# Patient Record
Sex: FEMALE | Race: BLACK OR AFRICAN AMERICAN
[De-identification: names, ages, dates, MRNs, and addresses within clinical notes are randomized per-mention and may not be internally consistent; named-entity substitution may affect disease eponyms.]

---

## 2018-04-06 ENCOUNTER — HOSPITAL ENCOUNTER (EMERGENCY)
Dept: HOSPITAL 17 - NEPD | Age: 58
Discharge: HOME | End: 2018-04-06
Payer: COMMERCIAL

## 2018-04-06 VITALS — HEIGHT: 67 IN | BODY MASS INDEX: 37.72 KG/M2 | WEIGHT: 240.3 LBS

## 2018-04-06 VITALS
DIASTOLIC BLOOD PRESSURE: 87 MMHG | OXYGEN SATURATION: 98 % | RESPIRATION RATE: 18 BRPM | TEMPERATURE: 98.4 F | HEART RATE: 84 BPM | SYSTOLIC BLOOD PRESSURE: 150 MMHG

## 2018-04-06 DIAGNOSIS — S51.812A: Primary | ICD-10-CM
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
## 2018-04-06 PROCEDURE — 12001 RPR S/N/AX/GEN/TRNK 2.5CM/<: CPT

## 2018-04-06 NOTE — PD
HPI


Chief Complaint:  Laceration/Skin Injury


Time Seen by Provider:  23:02


Travel History


International Travel<30 days:  No


Contact w/Intl Traveler<30days:  No


Traveled to known affect area:  No





History of Present Illness


HPI


Patient is a 57-year-old female presenting to emergency department for 

evaluation of a laceration.  Patient states she was trying to open a bag with a 

knife around 5 PM this evening when it slipped cutting her left forearm.  

Patient reports her pain is a 7 out of 10 and states is throbbing.  Her tetanus 

vaccine is up-to-date.  Symptom onset was sudden, symptoms are moderate in 

severity.  There are no alleviating factors.  She has no other complaints at 

this time.





[facility]


Past Medical History


Medical History:  Denies Significant Hx


Tetanus Vaccination:  < 5 Years





Social History


Tobacco Use:  No





Allergies-Medications


(Allergen,Severity, Reaction):  


Coded Allergies:  


     diatrizoate meglumine (Unverified  Allergy, Severe, 4/6/18)


     gadobenic acid (Unverified  Allergy, Severe, 4/6/18)


     gadodiamide (Unverified  Allergy, Severe, 4/6/18)


     gadoteridol (Unverified  Allergy, Severe, 4/6/18)


     iodixanol (Unverified  Allergy, Severe, 4/6/18)


     iohexol (Unverified  Allergy, Severe, 4/6/18)


Reported Meds & Prescriptions





Reported Meds & Active Scripts


Active


Ibuprofen 800 Mg Tab 800 Mg PO Q8 PRN


Robaxin (Methocarbamol) 750 Mg Tab 750 Mg PO QID PRN


Reported


Amlodipine Besylate 5 Mg Tab 1 Tab PO DAILY 








Review of Systems


Except as stated in HPI:  all other systems reviewed are Neg


Skin:  Positive Other (Laceration)





Physical Exam


Narrative


GENERAL: Well-developed, well-nourished, alert female.  Presenting in no acute 

distress.


SKIN: Warm and dry.  1 cm superficial laceration to the mid left forearm.


HEAD: Normocephalic.


EYES: No scleral icterus. No injection or drainage. 


NECK: Supple, trachea midline. No JVD or lymphadenopathy.


CARDIOVASCULAR: Regular rate and rhythm without murmurs, gallops, or rubs. 


RESPIRATORY: Breath sounds equal bilaterally. No accessory muscle use.


GASTROINTESTINAL: Abdomen soft, non-tender, nondistended. 


MUSCULOSKELETAL: No cyanosis, or edema.  5 out of 5  strength of bilateral 

upper extremities, 2+ radial pulse.


BACK: Nontender without obvious deformity. No CVA tenderness.








Data


Data


Last Documented VS





Vital Signs








  Date Time  Temp Pulse Resp B/P (MAP) Pulse Ox O2 Delivery O2 Flow Rate FiO2


 


4/6/18 18:35 98.4 84 18 150/87 (108) 98   








Orders





 Orders


Oxycodone-Acetamin 5-325 Mg (Percocet (4/6/18 23:45)








MDM


Medical Decision Making


Medical Screen Exam Complete:  Yes


Emergency Medical Condition:  Yes


Interpretation(s)





Vital Signs








  Date Time  Temp Pulse Resp B/P (MAP) Pulse Ox O2 Delivery O2 Flow Rate FiO2


 


4/6/18 18:35 98.4 84 18 150/87 (108) 98   








Differential Diagnosis


Laceration versus abrasion versus tendon injury versus other


Narrative Course


Patient is a 57-year-old female presenting for evaluation of a laceration.  

Patient is neurovascularly intact with no focal deficits on exam.  There is no 

weakness in that extremity, no sign of tendon injury.  Please see procedure 

report for laceration repair.  Patient was given Percocet 1 for pain.  She was 

encouraged to follow-up with her primary doctor.  She was advised on signs and 

symptoms of infection.  He was also given wound care instructions.  She 

verbalized understanding of these instructions.  Patient stable for discharge.





Procedures


**Procedure Narrative**


LACERATION


LOCATION: Left forearm


LENGTH: 1 cm


NUMBER OF STITCHES/STAPLES: 2 stitches





REPAIR: The area of the laceration was prepped with Betadine and sterilely 

draped.  The laceration was infiltrated with  


1% lidocaine with.  The wound was copiously irrigated and explored without 

evidence of foreign body, tendon injury or neurovascular  


injury.  The wound was closed using 4-0 Ethilon. This was a 1 layer repair. A 

sterile dressing was applied. The patient was  


advised to keep the dressing clean and dry. Patient tolerated the procedure 

well.





Diagnosis





 Primary Impression:  


 Laceration of forearm


 Qualified Codes:  S51.812A - Laceration without foreign body of left forearm, 

initial encounter


Referrals:  


Primary Care Physician


1 week


Patient Instructions:  Care For Your Stitches (ED), General Instructions, 

Laceration (ED)





***Additional Instructions:  


Keep stitches clean and dry, stitches will need to be removed in 1 week


Return to emergency department for follow-up with your primary doctor


Take medication as needed and as directed for pain


Return immediately for any new or worsening symptoms


***Med/Other Pt SpecificInfo:  Prescription(s) given


Scripts


Ibuprofen (Ibuprofen) 800 Mg Tab


800 MG PO Q6HR Y for PAIN, #40 TAB 0 Refills


   Prov: Jannet Louis         4/6/18


Disposition:  01 DISCHARGE HOME


Condition:  Stable











Jannet Louis Apr 6, 2018 23:40